# Patient Record
Sex: MALE | Race: WHITE | NOT HISPANIC OR LATINO | URBAN - NONMETROPOLITAN AREA
[De-identification: names, ages, dates, MRNs, and addresses within clinical notes are randomized per-mention and may not be internally consistent; named-entity substitution may affect disease eponyms.]

---

## 2018-09-04 PROBLEM — H52.223: Noted: 2018-09-04

## 2018-09-04 PROBLEM — H40.1134: Noted: 2018-09-04

## 2018-09-04 PROBLEM — H52.4: Noted: 2018-09-04

## 2019-05-02 ENCOUNTER — IMPORTED ENCOUNTER (OUTPATIENT)
Dept: URBAN - NONMETROPOLITAN AREA CLINIC 1 | Facility: CLINIC | Age: 64
End: 2019-05-02

## 2019-05-02 PROCEDURE — 92083 EXTENDED VISUAL FIELD XM: CPT

## 2019-05-02 NOTE — PATIENT DISCUSSION
Testing Only-VF 24-2 OUOD: R scattered scotoma non-specific defectsOS: R full fieldNo glaucomatous defects OD & OS; 's Notes: Does not have a PCPMRDFEPhotosGonioVF 24-2 OCT ONPach 10/15/2018- 251 201 JWVXP

## 2019-05-15 ENCOUNTER — IMPORTED ENCOUNTER (OUTPATIENT)
Dept: URBAN - NONMETROPOLITAN AREA CLINIC 1 | Facility: CLINIC | Age: 64
End: 2019-05-15

## 2019-05-15 PROCEDURE — 99212 OFFICE O/P EST SF 10 MIN: CPT

## 2019-05-15 NOTE — PATIENT DISCUSSION
POAG OU-  discussed findings w/patient-  C/D 0.6 0.75  c:d asymmetry w/ inferior thinning OS-  IOP 16 OD 21OS-  he is currently only taking Alphagan P 0.1% BID OU ( states he ran out has not been on it since feb-march ) since pt is not on drops continue to observe without drops but re-evaluate in 3 months.  -  patient states that when he goes through depression he does not do well with taking drops or coming to his appointmentsNS OU- Discussed diagnosis in detail with patient- Discussed signs and symptoms associated- Recommend UV protection - Continue to monitor yearly or prnPresbyopia OU- Dicussed diagnosis in detail with patient- Continue to monitor yearly or prn; 's Notes: Does not have a PCPMRDFEPhotosGonioVF 24-2 OCT ONPach 10/15/2018- Billy [address]

## 2022-04-09 ASSESSMENT — PACHYMETRY
OD_CT_UM: 560; ADJ: WNL
OS_CT_UM: 560; ADJ: WNL

## 2022-04-09 ASSESSMENT — TONOMETRY
OD_IOP_MMHG: 16
OS_IOP_MMHG: 21

## 2022-04-09 ASSESSMENT — VISUAL ACUITY
OS_SC: 20/25
OD_SC: 20/25

## 2023-09-08 ENCOUNTER — NEW PATIENT (OUTPATIENT)
Dept: URBAN - NONMETROPOLITAN AREA CLINIC 4 | Facility: CLINIC | Age: 68
End: 2023-09-08

## 2023-09-08 DIAGNOSIS — H52.4: ICD-10-CM

## 2023-09-08 DIAGNOSIS — H25.813: ICD-10-CM

## 2023-09-08 DIAGNOSIS — H52.223: ICD-10-CM

## 2023-09-08 DIAGNOSIS — H40.1134: ICD-10-CM

## 2023-09-08 DIAGNOSIS — H52.03: ICD-10-CM

## 2023-09-08 DIAGNOSIS — H43.812: ICD-10-CM

## 2023-09-08 PROCEDURE — 99204 OFFICE O/P NEW MOD 45 MIN: CPT

## 2023-09-08 PROCEDURE — 92015 DETERMINE REFRACTIVE STATE: CPT

## 2023-09-08 PROCEDURE — 92133 CPTRZD OPH DX IMG PST SGM ON: CPT

## 2023-09-08 PROCEDURE — 76514 ECHO EXAM OF EYE THICKNESS: CPT

## 2023-09-08 RX ORDER — LATANOPROST 50 UG/ML: 1 SOLUTION/ DROPS OPHTHALMIC

## 2023-09-08 ASSESSMENT — VISUAL ACUITY
OD_BAT: 20/30
OS_CC: 20/40-1
OS_BAT: 20/50
OS_PH: 20/20-2
OD_CC: 20/25

## 2023-09-08 ASSESSMENT — TONOMETRY
OD_IOP_MMHG: 24
OS_IOP_MMHG: 21

## 2023-09-08 ASSESSMENT — PACHYMETRY
OD_CT_UM: 562
OS_CT_UM: 562

## 2023-10-26 ENCOUNTER — CONSULTATION/EVALUATION (OUTPATIENT)
Dept: URBAN - NONMETROPOLITAN AREA CLINIC 4 | Facility: CLINIC | Age: 68
End: 2023-10-26

## 2023-10-26 DIAGNOSIS — H35.373: ICD-10-CM

## 2023-10-26 DIAGNOSIS — H25.813: ICD-10-CM

## 2023-10-26 PROCEDURE — 92136 OPHTHALMIC BIOMETRY: CPT

## 2023-10-26 PROCEDURE — 99214 OFFICE O/P EST MOD 30 MIN: CPT

## 2023-10-26 PROCEDURE — 92134 CPTRZ OPH DX IMG PST SGM RTA: CPT | Mod: NC

## 2023-10-26 PROCEDURE — 92025 CPTRIZED CORNEAL TOPOGRAPHY: CPT | Mod: NC

## 2023-10-26 ASSESSMENT — VISUAL ACUITY
OS_BAT: 20/40
OU_CC: 20/30-1
OS_SC: 20/40-1
OD_PAM: 20/30
OS_CC: 20/30-1
OS_CC: 20/30-1
OD_BAT: 20/40
OD_SC: 20/40-1
OS_AM: 20/25-2
OD_CC: 20/25-1
OU_SC: 20/30
OD_CC: 20/30-1
OS_PH: 20/25
OU_CC: 20/25-1
OD_PH: 20/25

## 2023-10-26 ASSESSMENT — KERATOMETRY
OD_K1POWER_DIOPTERS: 44.12
OD_K2POWER_DIOPTERS: 42.56
OS_K1POWER_DIOPTERS: 44.29
OS_AXISANGLE_DEGREES: 170
OD_AXISANGLE_DEGREES: 8
OS_AXISANGLE2_DEGREES: 80
OD_AXISANGLE2_DEGREES: 98
OS_K2POWER_DIOPTERS: 42.56

## 2023-10-26 ASSESSMENT — TONOMETRY
OS_IOP_MMHG: 21
OD_IOP_MMHG: 21

## 2023-11-28 ENCOUNTER — PRE-OP/H&P (OUTPATIENT)
Dept: RURAL CLINIC 1 | Facility: CLINIC | Age: 68
End: 2023-11-28

## 2023-11-28 VITALS
BODY MASS INDEX: 24.44 KG/M2 | WEIGHT: 165 LBS | HEART RATE: 67 BPM | DIASTOLIC BLOOD PRESSURE: 76 MMHG | HEIGHT: 69 IN | SYSTOLIC BLOOD PRESSURE: 128 MMHG

## 2023-11-28 DIAGNOSIS — C61: ICD-10-CM

## 2023-11-28 DIAGNOSIS — Z01.818: ICD-10-CM

## 2023-11-28 DIAGNOSIS — G43.009: ICD-10-CM

## 2023-11-28 DIAGNOSIS — F41.9: ICD-10-CM

## 2023-11-28 PROCEDURE — 99213 OFFICE O/P EST LOW 20 MIN: CPT

## 2023-12-14 ENCOUNTER — SURGERY/PROCEDURE (OUTPATIENT)
Dept: URBAN - METROPOLITAN AREA SURGERY 3 | Facility: SURGERY | Age: 68
End: 2023-12-14

## 2023-12-14 DIAGNOSIS — H25.811: ICD-10-CM

## 2023-12-14 PROCEDURE — 66984 XCAPSL CTRC RMVL W/O ECP: CPT

## 2023-12-15 ENCOUNTER — POST-OP (OUTPATIENT)
Dept: URBAN - NONMETROPOLITAN AREA CLINIC 4 | Facility: CLINIC | Age: 68
End: 2023-12-15

## 2023-12-15 DIAGNOSIS — Z96.1: ICD-10-CM

## 2023-12-15 PROCEDURE — 99024 POSTOP FOLLOW-UP VISIT: CPT

## 2023-12-15 ASSESSMENT — TONOMETRY: OD_IOP_MMHG: 22

## 2023-12-15 ASSESSMENT — VISUAL ACUITY: OD_SC: 20/40-1

## 2023-12-18 ENCOUNTER — POST OP/EVAL OF SECOND EYE (OUTPATIENT)
Dept: URBAN - NONMETROPOLITAN AREA CLINIC 4 | Facility: CLINIC | Age: 68
End: 2023-12-18

## 2023-12-18 DIAGNOSIS — Z96.1: ICD-10-CM

## 2023-12-18 DIAGNOSIS — H25.812: ICD-10-CM

## 2023-12-18 PROCEDURE — 99213 OFFICE O/P EST LOW 20 MIN: CPT

## 2023-12-18 ASSESSMENT — TONOMETRY
OD_IOP_MMHG: 20
OS_IOP_MMHG: 21

## 2023-12-18 ASSESSMENT — KERATOMETRY
OD_K1POWER_DIOPTERS: 44.12
OS_AXISANGLE2_DEGREES: 80
OS_K2POWER_DIOPTERS: 42.56
OD_K2POWER_DIOPTERS: 42.56
OD_AXISANGLE_DEGREES: 8
OD_AXISANGLE2_DEGREES: 98
OS_K1POWER_DIOPTERS: 44.29
OS_AXISANGLE_DEGREES: 170

## 2023-12-18 ASSESSMENT — VISUAL ACUITY
OS_BAT: 20/40
OS_AM: 20/20
OS_SC: 20/70
OD_SC: 20/30-2
OS_CC: 20/25-2

## 2023-12-20 ENCOUNTER — SURGERY/PROCEDURE (OUTPATIENT)
Dept: URBAN - METROPOLITAN AREA SURGERY 3 | Facility: SURGERY | Age: 68
End: 2023-12-20

## 2023-12-20 DIAGNOSIS — H25.812: ICD-10-CM

## 2023-12-20 PROCEDURE — 68841 INSJ RX ELUT IMPLT LAC CANAL: CPT

## 2023-12-20 PROCEDURE — 66984 XCAPSL CTRC RMVL W/O ECP: CPT

## 2023-12-21 ENCOUNTER — POST-OP (OUTPATIENT)
Dept: URBAN - NONMETROPOLITAN AREA CLINIC 4 | Facility: CLINIC | Age: 68
End: 2023-12-21

## 2023-12-21 DIAGNOSIS — Z96.1: ICD-10-CM

## 2023-12-21 PROCEDURE — 99024 POSTOP FOLLOW-UP VISIT: CPT

## 2023-12-21 ASSESSMENT — TONOMETRY
OD_IOP_MMHG: 15
OS_IOP_MMHG: 22

## 2023-12-21 ASSESSMENT — VISUAL ACUITY
OD_SC: 20/30+2
OS_PH: 20/25-1
OS_SC: 20/40-1

## 2023-12-22 ASSESSMENT — KERATOMETRY
OD_AXISANGLE_DEGREES: 8
OD_AXISANGLE2_DEGREES: 98
OS_AXISANGLE_DEGREES: 170
OS_AXISANGLE2_DEGREES: 80
OD_K1POWER_DIOPTERS: 44.12
OD_K2POWER_DIOPTERS: 42.56
OS_K2POWER_DIOPTERS: 42.56
OS_K1POWER_DIOPTERS: 44.29

## 2023-12-28 ENCOUNTER — POST-OP (OUTPATIENT)
Dept: URBAN - NONMETROPOLITAN AREA CLINIC 4 | Facility: CLINIC | Age: 68
End: 2023-12-28

## 2023-12-28 DIAGNOSIS — Z96.1: ICD-10-CM

## 2023-12-28 PROCEDURE — 99024 POSTOP FOLLOW-UP VISIT: CPT

## 2023-12-28 ASSESSMENT — TONOMETRY
OD_IOP_MMHG: 16
OS_IOP_MMHG: 16

## 2023-12-28 ASSESSMENT — VISUAL ACUITY
OS_SC: 20/50
OD_SC: 20/40

## 2024-03-21 ENCOUNTER — FOLLOW UP (OUTPATIENT)
Dept: URBAN - NONMETROPOLITAN AREA CLINIC 4 | Facility: CLINIC | Age: 69
End: 2024-03-21

## 2024-03-21 DIAGNOSIS — H52.223: ICD-10-CM

## 2024-03-21 DIAGNOSIS — H35.373: ICD-10-CM

## 2024-03-21 DIAGNOSIS — H43.812: ICD-10-CM

## 2024-03-21 DIAGNOSIS — H26.493: ICD-10-CM

## 2024-03-21 DIAGNOSIS — H40.1134: ICD-10-CM

## 2024-03-21 DIAGNOSIS — H52.03: ICD-10-CM

## 2024-03-21 DIAGNOSIS — Z96.1: ICD-10-CM

## 2024-03-21 DIAGNOSIS — H52.4: ICD-10-CM

## 2024-03-21 PROCEDURE — 99213 OFFICE O/P EST LOW 20 MIN: CPT

## 2024-03-21 ASSESSMENT — TONOMETRY
OD_IOP_MMHG: 16
OS_IOP_MMHG: 16

## 2024-03-21 ASSESSMENT — VISUAL ACUITY
OD_CC: 20/20-2
OS_CC: 20/20

## 2024-06-19 ENCOUNTER — FOLLOW UP (OUTPATIENT)
Dept: URBAN - NONMETROPOLITAN AREA CLINIC 4 | Facility: CLINIC | Age: 69
End: 2024-06-19

## 2024-06-19 DIAGNOSIS — Z96.1: ICD-10-CM

## 2024-06-19 DIAGNOSIS — H26.493: ICD-10-CM

## 2024-06-19 DIAGNOSIS — H40.1131: ICD-10-CM

## 2024-06-19 PROCEDURE — 99213 OFFICE O/P EST LOW 20 MIN: CPT

## 2024-06-19 PROCEDURE — 92083 EXTENDED VISUAL FIELD XM: CPT

## 2024-06-19 ASSESSMENT — VISUAL ACUITY
OS_CC: 20/25+1
OD_CC: 20/25

## 2024-06-19 ASSESSMENT — TONOMETRY
OD_IOP_MMHG: 20
OS_IOP_MMHG: 20

## 2024-12-27 ENCOUNTER — COMPREHENSIVE EXAM (OUTPATIENT)
Age: 69
End: 2024-12-27

## 2024-12-27 DIAGNOSIS — H52.223: ICD-10-CM

## 2024-12-27 DIAGNOSIS — H40.1131: ICD-10-CM

## 2024-12-27 DIAGNOSIS — Z96.1: ICD-10-CM

## 2024-12-27 DIAGNOSIS — H52.4: ICD-10-CM

## 2024-12-27 DIAGNOSIS — H52.03: ICD-10-CM

## 2024-12-27 DIAGNOSIS — H35.373: ICD-10-CM

## 2024-12-27 DIAGNOSIS — H26.493: ICD-10-CM

## 2024-12-27 PROCEDURE — 92133 CPTRZD OPH DX IMG PST SGM ON: CPT

## 2024-12-27 PROCEDURE — 92014 COMPRE OPH EXAM EST PT 1/>: CPT

## 2025-05-14 ENCOUNTER — COMPREHENSIVE EXAM (OUTPATIENT)
Age: 70
End: 2025-05-14

## 2025-05-14 DIAGNOSIS — H40.1131: ICD-10-CM

## 2025-05-14 PROCEDURE — 99213 OFFICE O/P EST LOW 20 MIN: CPT

## 2025-05-14 PROCEDURE — 92083 EXTENDED VISUAL FIELD XM: CPT
